# Patient Record
Sex: MALE | Race: WHITE | NOT HISPANIC OR LATINO | URBAN - METROPOLITAN AREA
[De-identification: names, ages, dates, MRNs, and addresses within clinical notes are randomized per-mention and may not be internally consistent; named-entity substitution may affect disease eponyms.]

---

## 2017-03-07 ENCOUNTER — OFFICE VISIT - HEALTHEAST (OUTPATIENT)
Dept: FAMILY MEDICINE | Facility: CLINIC | Age: 32
End: 2017-03-07

## 2017-03-07 DIAGNOSIS — L30.9 ECZEMA: ICD-10-CM

## 2017-03-07 DIAGNOSIS — I83.90 VARICOSE VEIN OF LEG: ICD-10-CM

## 2017-03-07 RX ORDER — TRIAMCINOLONE ACETONIDE 1 MG/G
CREAM TOPICAL
Qty: 45 G | Refills: 3 | Status: SHIPPED | OUTPATIENT
Start: 2017-03-07

## 2017-03-20 ENCOUNTER — COMMUNICATION - HEALTHEAST (OUTPATIENT)
Dept: ADMINISTRATIVE | Facility: CLINIC | Age: 32
End: 2017-03-20

## 2019-07-29 ENCOUNTER — COMMUNICATION - HEALTHEAST (OUTPATIENT)
Dept: FAMILY MEDICINE | Facility: CLINIC | Age: 34
End: 2019-07-29

## 2019-07-29 DIAGNOSIS — Z23 NEED FOR VACCINATION: ICD-10-CM

## 2019-07-30 ENCOUNTER — AMBULATORY - HEALTHEAST (OUTPATIENT)
Dept: NURSING | Facility: CLINIC | Age: 34
End: 2019-07-30

## 2019-07-30 DIAGNOSIS — Z23 NEED FOR VACCINATION: ICD-10-CM

## 2019-10-22 ENCOUNTER — AMBULATORY - HEALTHEAST (OUTPATIENT)
Dept: NURSING | Facility: CLINIC | Age: 34
End: 2019-10-22

## 2020-10-20 ENCOUNTER — AMBULATORY - HEALTHEAST (OUTPATIENT)
Dept: NURSING | Facility: CLINIC | Age: 35
End: 2020-10-20

## 2021-05-19 ENCOUNTER — HOSPITAL ENCOUNTER (OUTPATIENT)
Dept: CT IMAGING | Facility: CLINIC | Age: 36
Discharge: HOME | End: 2021-05-19

## 2021-05-19 ENCOUNTER — COMMUNICATION - HEALTHEAST (OUTPATIENT)
Dept: FAMILY MEDICINE | Facility: CLINIC | Age: 36
End: 2021-05-19

## 2021-05-19 ENCOUNTER — OFFICE VISIT - HEALTHEAST (OUTPATIENT)
Dept: FAMILY MEDICINE | Facility: CLINIC | Age: 36
End: 2021-05-19

## 2021-05-19 DIAGNOSIS — R10.9 FLANK PAIN: ICD-10-CM

## 2021-05-19 DIAGNOSIS — R31.9 HEMATURIA, UNSPECIFIED TYPE: ICD-10-CM

## 2021-05-19 DIAGNOSIS — R39.15 URINARY URGENCY: ICD-10-CM

## 2021-05-19 LAB
ALBUMIN UR-MCNC: NEGATIVE G/DL
APPEARANCE UR: CLEAR
BACTERIA #/AREA URNS HPF: ABNORMAL /[HPF]
BILIRUB UR QL STRIP: NEGATIVE
COLOR UR AUTO: YELLOW
GLUCOSE UR STRIP-MCNC: NEGATIVE MG/DL
HGB UR QL STRIP: ABNORMAL
KETONES UR STRIP-MCNC: NEGATIVE MG/DL
LEUKOCYTE ESTERASE UR QL STRIP: NEGATIVE
NITRATE UR QL: NEGATIVE
PH UR STRIP: 8.5 [PH] (ref 5–8)
RBC #/AREA URNS AUTO: ABNORMAL HPF
SP GR UR STRIP: 1.02 (ref 1–1.03)
SQUAMOUS #/AREA URNS AUTO: ABNORMAL LPF
UROBILINOGEN UR STRIP-ACNC: ABNORMAL
WBC #/AREA URNS AUTO: ABNORMAL HPF

## 2021-05-19 RX ORDER — IBUPROFEN 600 MG/1
600 TABLET, FILM COATED ORAL EVERY 6 HOURS PRN
Qty: 60 TABLET | Refills: 2 | Status: SHIPPED | OUTPATIENT
Start: 2021-05-19

## 2021-05-27 VITALS
OXYGEN SATURATION: 97 % | DIASTOLIC BLOOD PRESSURE: 74 MMHG | RESPIRATION RATE: 20 BRPM | SYSTOLIC BLOOD PRESSURE: 118 MMHG | HEART RATE: 88 BPM | TEMPERATURE: 99 F | WEIGHT: 181.4 LBS

## 2021-05-30 VITALS — WEIGHT: 164.2 LBS

## 2021-06-09 NOTE — PROGRESS NOTES
ASSESSMENT/PLAN:       1. Varicose vein of leg  -We discussed that he would need to wear compression socks for at least 3 months prior to being able to have any sort of intervention done on this vein.  We did provide him with a prescription today to start using.  I did place a referral for the vascular center as well.  They will follow-up there as needed.  Call here if they have any other questions or concerns.  - Ambulatory referral to Vascular Center    2. Eczema  -Mild eczema present today on exam.  He does have a high risk job given how much he has to wash his hands.  Triamcinolone cream provided for him today, discussed good hydration of the skin with an adequate lotion and follow-up here as needed.  - triamcinolone (KENALOG) 0.1 % cream; Apply small amount to affected 2-3 times daily until gone  Dispense: 45 g; Refill: 3        Payal Garcia MD      PROGRESS NOTE   3/7/2017    SUBJECTIVE:  Theo Olivier is a 31 y.o. male  who presents for two issues.     He has a vein on his medial right leg that bothers him.  It has been there for some time, at least a few years.  It does hurt at times as well and it seems to be quite superficial which makes the patient nervous.  Apparently his father-in-law had some varicose veins that became ulcers and he needed to have chronic wound care to help heal them and this also makes him nervous.  He does stand at work quite a bit as he works at a [employer].     He has some eczema on his hands and sometimes on his face on the right side of his nose and then at times around his waist where the belt will rub. He was using a steroid cream that they had which did seem to help a little. He was using OTC lotion and that was making things worse.     Chief Complaint   Patient presents with     Varicose Veins     Patient would like to discuss varicose veins, wonders about his right leg.      Eczema     Patient would like to discuss the ecxema flare up on his hands.           Patient Active Problem List   Diagnosis     Erectile dysfunction     Kidney stone     Eczema       Current Outpatient Prescriptions   Medication Sig Dispense Refill     acetaminophen (TYLENOL) 325 MG tablet Take 650 mg by mouth every 6 (six) hours as needed for pain.       sildenafil (VIAGRA) 100 MG tablet Take 1/2-1 tablet about 30min prior to sex 18 tablet 3     dimenhyDRINATE (DRAMAMINE) 50 mg Chew Chew 1 tablet (50 mg total) 3 (three) times a day. 21 each 0     ibuprofen (ADVIL,MOTRIN) 200 MG tablet Take 800 mg by mouth 3 (three) times a day as needed for pain.        ibuprofen (ADVIL,MOTRIN) 600 MG tablet Take 1 tablet (600 mg total) by mouth every 6 (six) hours as needed for pain. 20 tablet 0     triamcinolone (KENALOG) 0.1 % cream Apply small amount to affected 2-3 times daily until gone 45 g 3     No current facility-administered medications for this visit.        History   Smoking Status     Never Smoker   Smokeless Tobacco     Not on file           OBJECTIVE:        No results found for this or any previous visit (from the past 240 hour(s)).    Vitals:    03/07/17 1402   BP: 130/76   Patient Site: Right Arm   Patient Position: Sitting   Cuff Size: Adult Regular   Pulse: 78   Weight: 164 lb 3.2 oz (74.5 kg)     Weight: 164 lb 3.2 oz (74.5 kg)        Physical Exam:  GENERAL APPEARANCE: A&A, NAD, well hydrated, well nourished  SKIN:  Normal skin turgor, mild dryness and scaling over the knuckles on bilateral hands, face appears unremarkable today  HEENT: moist mucous membranes, no rhinorrhea  EXTREMITY: no edema, right lower extremity with varicose vein present medially.  NEURO: no gross deficits

## 2021-06-15 PROBLEM — L30.9 ECZEMA: Status: ACTIVE | Noted: 2017-03-07

## 2021-06-17 NOTE — TELEPHONE ENCOUNTER
New Appointment Needed  What is the reason for the visit:    New patient wanting to establish care with Dr. Garcia at the [place] location. The patient would like to be seen for a preventative care check, and also discuss testosterone level concerns that they have.  Provider Preference: Dr. Garcia  How soon do you need to be seen?: whenever possible  Waitlist offered?: No  Okay to leave a detailed message:  Yes

## 2021-06-18 NOTE — PATIENT INSTRUCTIONS - HE
Patient Instructions by Isi Berry MD at 5/19/2021 11:10 AM     Author: Isi Berry MD Service: -- Author Type: Physician    Filed: 5/19/2021 11:57 AM Encounter Date: 5/19/2021 Status: Signed    : Isi Berry MD (Physician)       Patient Education     Kidney Stone with Pain    The sharp cramping pain on either side of your lower back and nausea/vomiting that you have are because of a small stone that has formed in the kidney. It is now passing down a narrow tube (ureter) on its way to your bladder. Once the stone reaches your bladder, the pain will often stop. But it may come back as the stone continues to pass out of the bladder and through the urethra. The stone may pass in your urine stream in one piece. The size may be 1/16 inch to 1/4 inch (1 mm to 6 mm). Or, the stone may break up into cristina fragments that you may not even notice.  Once you have had a kidney stone, you are at risk of getting another one in the future. There are 4 types of kidney stones. Eighty percent are calcium stones--mostly calcium oxalate but also some with calcium phosphate. The other 3 types include uric acid stones, struvite stones (from a preceding infection), and rarely, cystine stones.  Most stones will pass on their own, but may take from a few hours to a few days. Sometimes the stone is too large to pass by itself. In that case, the healthcare provider will need to use other ways to remove the stone. These techniques include:    Lithotripsy. This uses ultrasound waves to break up the stone.    Ureteroscopy. This pushes a basket-like instrument through the urethra and bladder and into the ureter to pull out the stone.    Various types of direct surgery through the skin  Home care  The following are general care guidelines:    Drink plenty of fluids. This means at least 12, 8-ounce glasses of fluid--mostly water--a day.    Each time you urinate, do so in a jar. Pour the urine from the  jar through the strainer and into the toilet. Continue doing this until 24 hours after your pain stops. By then, if there was a kidney stone, it should pass from your bladder. Some stones dissolve into sand-like particles and pass right through the strainer. In that case, you wont ever see a stone.    Save any stone that you find in the strainer and bring it to your healthcare provider to look at. It may be possible to stop certain types of stones from forming. For this reason, it is important to know what kind of stone you have.    Try to stay as active as possible. This will help the stone pass. Don't stay in bed unless your pain keeps you from getting up. You may notice a red, pink, or brown color to your urine. This is normal while passing a kidney stone.    If you develop pain, you may take ibuprofen or naproxen for pain, unless another medicine was prescribed. If you have chronic liver or kidney disease, talk with your healthcare provider before taking these medicines. Also talk with your provider if you've had a stomach ulcer or GI bleeding.  Preventing stones  Each year for the next 5 to 7 years, you are at risk that a new stone will form. Your risk is a 50% chance over this time period. The risk is higher if you have a family history of kidney stones or have certain chronic illnesses like hypertension, obesity, or diabetes. But you can make changes to your lifestyle and diet that can lower your risk for another stone.  Most kidney stones are made of calcium. The following is advice for preventing another calcium stone. If you dont know the type of stone you have, follow this advice until the cause of your stone is found.  Things that help:    The most important thing you can do is to drink plenty of fluids each day. See home care above.     Eat foods that contain phytates. These include wheat, rice, rye, barley, and beans. Phytates are substances that may lower your risk for any type of stone to  form.    Eat more fruits and vegetables. Choose those that are high in potassium.    Eat foods high in natural citrate like fruit and low-sugar fruit juices.    Having too little calcium in your diet can put you at risk for calcium kidney stones. Eat a normal amount of calcium in your diet and talk with your healthcare provider if you are taking calcium supplements. Cutting back on your calcium intake may raise your risk. New research shows that eating calcium-rich and oxalate-rich foods together lowers your risk for stones by binding the minerals in the stomach and intestines before they can reach the kidneys.      Limit salt intake to 2 grams (1 teaspoon) per day. Use limited amounts when cooking, and dont add salt at the table. Processed and canned foods are usually high in salt.     Spinach, rhubarb, peanuts, cashews, almonds, grapefruit, and grapefruit juice are all high oxalate foods. You should limit how much of these you eat. Or eat them with calcium-rich foods. These include dairy products, dark leafy greens, soy products, and calcium-enriched foods.    Reducing the amount of animal meat and high protein foods in your diet may lower your risk for uric acid stones.    Avoid excess sugar (sucrose) and fructose (sweetener in many soft drinks) in your diet.     If you take vitamin C as a supplement, don't take more than 1,000 mg a day.    A dietitian or your healthcare provider can give you information about changes in your diet that will help prevent more kidney stones from forming.  Follow-up care  Follow up with your healthcare provider, or as advised, if the pain lasts more than 48 hours. Talk with your provider about urine and blood tests to find out the cause of your stone. If you had an X-ray, CT scan, or other diagnostic test, you will be told of any new findings that may affect your care.  Call 911  Call 911 if you have any of these:    Weakness, dizziness, or fainting  When to seek medical  advice  Call your healthcare provider right away if any of these occur:    Pain that is not controlled by the medicine given    Repeated vomiting or unable to keep down fluids    Fever of 100.4 F (38 C) or higher, or as directed by your healthcare provider    Passage of solid red or brown urine (can't see through it) or urine with lots of blood clots    Foul-smelling or cloudy urine    Unable to pass urine for 8 hours and increasing bladder pressure  Date Last Reviewed: 10/1/2016    6400-3284 [employer]. [address] [place] [address], [place] [address]. All rights reserved. This information is not intended as a substitute for professional medical care. Always follow your healthcare professional's instructions.

## 2021-06-26 ENCOUNTER — HEALTH MAINTENANCE LETTER (OUTPATIENT)
Age: 36
End: 2021-06-26

## 2021-06-30 NOTE — PROGRESS NOTES
Progress Notes by Isi Berry MD at 5/19/2021 11:10 AM     Author: Isi Berry MD Service: -- Author Type: Physician    Filed: 5/19/2021  4:05 PM Encounter Date: 5/19/2021 Status: Signed    : Isi Berry MD (Physician)       Clinical Decision Making:      Recommended CT scan since he has had symptoms ongoing for about a week here without the stone passing.  Would like to make sure it is a size that he is able to pass as an outpatient.    CT results pending.  Will talk to patient after we get CT results.  Update: 3mm stone.  Should pass.  Follow plan that we discussed at clinic and follow up if worsening or not improving.    Discussed using the ibuprofen every 6 hours as needed for pain.  Take with food.  Flomax once a day.  Drink plenty of fluids, mostly water.  Strain urine.  Strainer provided.    At the end of the encounter, I discussed results, diagnosis, medications. Discussed red flags for immediate return to clinic/ER, as well as indications for follow up if no improvement. Patient understood and agreed to plan. Patient was stable for discharge.    1. Flank pain  Urinalysis-UC if Indicated    ketorolac injection 30 mg (TORADOL)    CT Abdomen Pelvis Without Oral Without IV Contrast    ibuprofen (ADVIL,MOTRIN) 600 MG tablet    tamsulosin (FLOMAX) 0.4 mg cap   2. Hematuria, unspecified type  CT Abdomen Pelvis Without Oral Without IV Contrast    ibuprofen (ADVIL,MOTRIN) 600 MG tablet    tamsulosin (FLOMAX) 0.4 mg cap   3. Urinary urgency  Urinalysis-UC if Indicated    ketorolac injection 30 mg (TORADOL)         Patient Instructions   Patient Education     Kidney Stone with Pain    The sharp cramping pain on either side of your lower back and nausea/vomiting that you have are because of a small stone that has formed in the kidney. It is now passing down a narrow tube (ureter) on its way to your bladder. Once the stone reaches your bladder, the pain will often stop. But  it may come back as the stone continues to pass out of the bladder and through the urethra. The stone may pass in your urine stream in one piece. The size may be 1/16 inch to 1/4 inch (1 mm to 6 mm). Or, the stone may break up into cristina fragments that you may not even notice.  Once you have had a kidney stone, you are at risk of getting another one in the future. There are 4 types of kidney stones. Eighty percent are calcium stones--mostly calcium oxalate but also some with calcium phosphate. The other 3 types include uric acid stones, struvite stones (from a preceding infection), and rarely, cystine stones.  Most stones will pass on their own, but may take from a few hours to a few days. Sometimes the stone is too large to pass by itself. In that case, the healthcare provider will need to use other ways to remove the stone. These techniques include:    Lithotripsy. This uses ultrasound waves to break up the stone.    Ureteroscopy. This pushes a basket-like instrument through the urethra and bladder and into the ureter to pull out the stone.    Various types of direct surgery through the skin  Home care  The following are general care guidelines:    Drink plenty of fluids. This means at least 12, 8-ounce glasses of fluid--mostly water--a day.    Each time you urinate, do so in a jar. Pour the urine from the jar through the strainer and into the toilet. Continue doing this until 24 hours after your pain stops. By then, if there was a kidney stone, it should pass from your bladder. Some stones dissolve into sand-like particles and pass right through the strainer. In that case, you wont ever see a stone.    Save any stone that you find in the strainer and bring it to your healthcare provider to look at. It may be possible to stop certain types of stones from forming. For this reason, it is important to know what kind of stone you have.    Try to stay as active as possible. This will help the stone pass. Don't stay in  bed unless your pain keeps you from getting up. You may notice a red, pink, or brown color to your urine. This is normal while passing a kidney stone.    If you develop pain, you may take ibuprofen or naproxen for pain, unless another medicine was prescribed. If you have chronic liver or kidney disease, talk with your healthcare provider before taking these medicines. Also talk with your provider if you've had a stomach ulcer or GI bleeding.  Preventing stones  Each year for the next 5 to 7 years, you are at risk that a new stone will form. Your risk is a 50% chance over this time period. The risk is higher if you have a family history of kidney stones or have certain chronic illnesses like hypertension, obesity, or diabetes. But you can make changes to your lifestyle and diet that can lower your risk for another stone.  Most kidney stones are made of calcium. The following is advice for preventing another calcium stone. If you dont know the type of stone you have, follow this advice until the cause of your stone is found.  Things that help:    The most important thing you can do is to drink plenty of fluids each day. See home care above.     Eat foods that contain phytates. These include wheat, rice, rye, barley, and beans. Phytates are substances that may lower your risk for any type of stone to form.    Eat more fruits and vegetables. Choose those that are high in potassium.    Eat foods high in natural citrate like fruit and low-sugar fruit juices.    Having too little calcium in your diet can put you at risk for calcium kidney stones. Eat a normal amount of calcium in your diet and talk with your healthcare provider if you are taking calcium supplements. Cutting back on your calcium intake may raise your risk. New research shows that eating calcium-rich and oxalate-rich foods together lowers your risk for stones by binding the minerals in the stomach and intestines before they can reach the kidneys.      Limit  salt intake to 2 grams (1 teaspoon) per day. Use limited amounts when cooking, and dont add salt at the table. Processed and canned foods are usually high in salt.     Spinach, rhubarb, peanuts, cashews, almonds, grapefruit, and grapefruit juice are all high oxalate foods. You should limit how much of these you eat. Or eat them with calcium-rich foods. These include dairy products, dark leafy greens, soy products, and calcium-enriched foods.    Reducing the amount of animal meat and high protein foods in your diet may lower your risk for uric acid stones.    Avoid excess sugar (sucrose) and fructose (sweetener in many soft drinks) in your diet.     If you take vitamin C as a supplement, don't take more than 1,000 mg a day.    A dietitian or your healthcare provider can give you information about changes in your diet that will help prevent more kidney stones from forming.  Follow-up care  Follow up with your healthcare provider, or as advised, if the pain lasts more than 48 hours. Talk with your provider about urine and blood tests to find out the cause of your stone. If you had an X-ray, CT scan, or other diagnostic test, you will be told of any new findings that may affect your care.  Call 911  Call 911 if you have any of these:    Weakness, dizziness, or fainting  When to seek medical advice  Call your healthcare provider right away if any of these occur:    Pain that is not controlled by the medicine given    Repeated vomiting or unable to keep down fluids    Fever of 100.4 F (38 C) or higher, or as directed by your healthcare provider    Passage of solid red or brown urine (can't see through it) or urine with lots of blood clots    Foul-smelling or cloudy urine    Unable to pass urine for 8 hours and increasing bladder pressure  Date Last Reviewed: 10/1/2016    7685-6702 [employer]. [address] [place] [address], [place] [address]. All rights reserved. This information is not intended as a substitute for  professional medical care. Always follow your healthcare professional's instructions.                  Chief Complaint   Patient presents with   ? right flank pain, urgency with urination     sx's x 3-4  days constant but off and on 2-3 weeks prior. Denies fevers.         HPI:  Theo Olivier is a 35 y.o. male who presents today complaining of right sided back pain and urinary frequency.  Pain is mostly at the right side of his back but it radiates through all the way to his penis.  Is been going on a little bit for 2 to 3 weeks but has been getting much worse over the last week.  He has increased his water intake and has taken ibuprofen 400 mg this morning.  He has not noticed blood in the urine but it does look dark.  He has no concern about sexually transmitted diseases.  No fevers or chills  No nausea or abdominal pain  He has had a little bit of loose stool.  Positive history of a renal stone about 3 years ago which passed on its own.    History obtained from the patient.    Problem List:  2017-03: Eczema  2015-07: Erectile dysfunction  2015-07: Kidney stone      Past Medical History:   Diagnosis Date   ? Kidney stone        Social History     Tobacco Use   ? Smoking status: Never Smoker   ? Smokeless tobacco: Never Used   Substance Use Topics   ? Alcohol use: Yes     Alcohol/week: 14.0 standard drinks     Types: 14 Shots of liquor per week       Review of systems  Review of systems is negative except as listed in HPI.      Vitals:    05/19/21 1102   BP: 118/74   Patient Site: Left Arm   Patient Position: Sitting   Cuff Size: Adult Regular   Pulse: 88   Resp: 20   Temp: 99  F (37.2  C)   TempSrc: Oral   SpO2: 97%   Weight: 181 lb 6.4 oz (82.3 kg)       Physical Exam  Vitals noted and within normal limits.  Patient is alert, oriented, and in mild distress as he is sitting leaning towards the left with his right hand on his back.  Lungs: Breathing is not labored  Abdomen: soft, nondistended and nontender.   Back:  Mild CVA tenderness on the right  UA noted with hematuria  Patient given Toradol 30 mg IM while in clinic.  Recheck shows him sitting more comfortably with his weight evenly distributed and his hand no longer on his back.  He reports improved pain control.    No notes on file    Labs:  Recent Results (from the past 72 hour(s))   Urinalysis-UC if Indicated   Result Value Ref Range    Color, UA Yellow Colorless, Yellow, Straw, Light Yellow    Clarity, UA Clear Clear    Glucose, UA Negative Negative    Protein, UA Negative Negative    Bilirubin, UA Negative Negative    Urobilinogen, UA 1.0 E.U./dL 0.2 E.U./dL, 1.0 E.U./dL    pH, UA 8.5 (H) 5.0 - 8.0    Blood, UA Large (!) Negative    Ketones, UA Negative Negative    Nitrite, UA Negative Negative    Leukocytes, UA Negative Negative    Specific Gravity, UA 1.020 1.005 - 1.030    RBC, UA 25-50 (!) None Seen, 0-2 hpf    WBC, UA 0-5 None Seen, 0-5 hpf    Bacteria, UA None Seen None Seen    Squam Epithel, UA 0-5 None Seen, 0-5 lpf       Radiology:  No results found.   Talked with radiologist.  3mm stone distal right ureter with mild hydronephrosis.  Discussed with pt on the phone.

## 2021-07-01 ENCOUNTER — AMBULATORY - HEALTHEAST (OUTPATIENT)
Dept: FAMILY MEDICINE | Facility: CLINIC | Age: 36
End: 2021-07-01

## 2021-07-01 DIAGNOSIS — Z23 IMMUNIZATION DUE: ICD-10-CM

## 2021-10-16 ENCOUNTER — HEALTH MAINTENANCE LETTER (OUTPATIENT)
Age: 36
End: 2021-10-16

## 2021-11-06 ENCOUNTER — IMMUNIZATION (OUTPATIENT)
Dept: FAMILY MEDICINE | Facility: CLINIC | Age: 36
End: 2021-11-06
Payer: COMMERCIAL

## 2021-11-06 PROCEDURE — 90471 IMMUNIZATION ADMIN: CPT

## 2021-11-06 PROCEDURE — 90686 IIV4 VACC NO PRSV 0.5 ML IM: CPT

## 2021-11-22 ENCOUNTER — IMMUNIZATION (OUTPATIENT)
Dept: NURSING | Facility: CLINIC | Age: 36
End: 2021-11-22
Payer: COMMERCIAL

## 2021-11-22 PROCEDURE — 91306 COVID-19,PF,MODERNA (18+ YRS BOOSTER .25ML): CPT

## 2021-11-22 PROCEDURE — 0064A COVID-19,PF,MODERNA (18+ YRS BOOSTER .25ML): CPT

## 2022-04-21 ENCOUNTER — LAB (OUTPATIENT)
Dept: LAB | Facility: CLINIC | Age: 37
End: 2022-04-21
Payer: COMMERCIAL

## 2022-04-21 DIAGNOSIS — Z11.1 SCREENING EXAMINATION FOR PULMONARY TUBERCULOSIS: ICD-10-CM

## 2022-04-21 PROCEDURE — 86481 TB AG RESPONSE T-CELL SUSP: CPT

## 2022-04-21 PROCEDURE — 36415 COLL VENOUS BLD VENIPUNCTURE: CPT

## 2022-04-23 LAB
QUANTIFERON MITOGEN: 10 IU/ML
QUANTIFERON NIL TUBE: 0.08 IU/ML
QUANTIFERON TB1 TUBE: 0.08 IU/ML
QUANTIFERON TB2 TUBE: 0.08

## 2022-04-24 LAB
GAMMA INTERFERON BACKGROUND BLD IA-ACNC: 0.08 IU/ML
M TB IFN-G BLD-IMP: NEGATIVE
M TB IFN-G CD4+ BCKGRND COR BLD-ACNC: 9.92 IU/ML
MITOGEN IGNF BCKGRD COR BLD-ACNC: 0 IU/ML
MITOGEN IGNF BCKGRD COR BLD-ACNC: 0 IU/ML

## 2022-05-06 DIAGNOSIS — N52.9 ERECTILE DYSFUNCTION, UNSPECIFIED ERECTILE DYSFUNCTION TYPE: Primary | ICD-10-CM

## 2022-05-09 RX ORDER — SILDENAFIL 100 MG/1
TABLET, FILM COATED ORAL
Qty: 18 TABLET | Refills: 1 | Status: SHIPPED | OUTPATIENT
Start: 2022-05-09 | End: 2022-08-22

## 2022-05-09 NOTE — TELEPHONE ENCOUNTER
"Last Written Prescription Date:  6/23/21  Last Fill Quantity: 18,  # refills: 3   Last office visit provider:  6/23/21     Requested Prescriptions   Pending Prescriptions Disp Refills     sildenafil (VIAGRA) 100 MG tablet [Pharmacy Med Name: SILDENAFIL 100MG] 18 tablet 2     Sig: TAKE 1/2-1 TABLET ABOUT 30MIN PRIOR TO SEX       Erectile Dysfuction Protocol Passed - 5/9/2022 11:50 AM        Passed - Absence of nitrates on medication list        Passed - Absence of Alpha Blockers on Med list        Passed - Recent (12 mo) or future (30 days) visit within the authorizing provider's specialty     Patient has had an office visit with the authorizing provider or a provider within the authorizing providers department within the previous 12 mos or has a future within next 30 days. See \"Patient Info\" tab in inbasket, or \"Choose Columns\" in Meds & Orders section of the refill encounter.              Passed - Medication is active on med list        Passed - Patient is age 18 or older             Chava Oneal RN 05/09/22 11:50 AM    "

## 2022-07-08 ENCOUNTER — MYC MEDICAL ADVICE (OUTPATIENT)
Dept: FAMILY MEDICINE | Facility: CLINIC | Age: 37
End: 2022-07-08

## 2022-07-08 NOTE — TELEPHONE ENCOUNTER
Medical message sent to patient  Ines E. Marcus Jones RN on 7/8/2022 at 12:45 PM

## 2022-07-11 ENCOUNTER — TELEPHONE (OUTPATIENT)
Dept: FAMILY MEDICINE | Facility: CLINIC | Age: 37
End: 2022-07-11

## 2022-07-11 DIAGNOSIS — N52.9 ERECTILE DYSFUNCTION, UNSPECIFIED ERECTILE DYSFUNCTION TYPE: ICD-10-CM

## 2022-07-11 NOTE — TELEPHONE ENCOUNTER
[facility] pharmacy sent refill form for Sildenafil 100mg tablet. Form faxed on 7/8/22. Please sign and return.    Fax# [phone]  EScribe# [id number]

## 2022-07-14 RX ORDER — SILDENAFIL 100 MG/1
TABLET, FILM COATED ORAL
Qty: 18 TABLET | Refills: 3 | Status: SHIPPED | OUTPATIENT
Start: 2022-07-14 | End: 2022-08-22

## 2022-07-14 NOTE — TELEPHONE ENCOUNTER
REFILL REQUEST. New pharmacy    Medication: Sildenafil 100mg  Last Date Filled: 5/9/22  Last appointment addressing medication: 6/23/21  Last B/P:  BP Readings from Last 3 Encounters:   06/23/21 128/78   05/19/21 118/74

## 2022-07-18 ENCOUNTER — NURSE TRIAGE (OUTPATIENT)
Dept: NURSING | Facility: CLINIC | Age: 37
End: 2022-07-18

## 2022-07-18 NOTE — TELEPHONE ENCOUNTER
Clinic Action Needed: Yes, please call pharmacy  [facility] Triage Call  Presenting Problem:    Caller: [facility]   Phone #: [phone]    Please call pharmacy for maximum daily dose of sildenafil, for billing purposes.     Isidra Ann RN/[place] Nurse Advisor          Reason for Disposition    Pharmacy calling with prescription questions and triager unable to answer question    Protocols used: MEDICATION QUESTION CALL-A-[place]

## 2022-07-18 NOTE — TELEPHONE ENCOUNTER
FNA called [facility] pharmacy physician line: [phone].    FNA relayed information below for sildenafil:  Maximum daily dose 100mg. - Dr. Garcia.    [facility] received the call and verbalized understanding.    Isidra Ann RN/[place] Nurse Advisor

## 2022-07-23 ENCOUNTER — HEALTH MAINTENANCE LETTER (OUTPATIENT)
Age: 37
End: 2022-07-23

## 2022-08-16 ASSESSMENT — ENCOUNTER SYMPTOMS
FREQUENCY: 0
HEARTBURN: 0
ARTHRALGIAS: 0
HEMATOCHEZIA: 0
NAUSEA: 0
PARESTHESIAS: 1
SHORTNESS OF BREATH: 0
CONSTIPATION: 0
MYALGIAS: 1
DIARRHEA: 0
DYSURIA: 0
ABDOMINAL PAIN: 0
DIZZINESS: 0
COUGH: 0
CHILLS: 0
FEVER: 0
HEMATURIA: 0
EYE PAIN: 0
NERVOUS/ANXIOUS: 0
JOINT SWELLING: 0
SORE THROAT: 0
PALPITATIONS: 0
WEAKNESS: 0
HEADACHES: 0

## 2022-08-17 ENCOUNTER — OFFICE VISIT (OUTPATIENT)
Dept: FAMILY MEDICINE | Facility: CLINIC | Age: 37
End: 2022-08-17
Payer: COMMERCIAL

## 2022-08-17 VITALS
DIASTOLIC BLOOD PRESSURE: 62 MMHG | TEMPERATURE: 98 F | WEIGHT: 154 LBS | HEART RATE: 61 BPM | SYSTOLIC BLOOD PRESSURE: 138 MMHG | BODY MASS INDEX: 22.81 KG/M2 | OXYGEN SATURATION: 98 % | HEIGHT: 69 IN

## 2022-08-17 DIAGNOSIS — Z23 IMMUNIZATION DUE: ICD-10-CM

## 2022-08-17 DIAGNOSIS — R20.2 PARESTHESIAS: ICD-10-CM

## 2022-08-17 DIAGNOSIS — K76.0 FATTY LIVER: ICD-10-CM

## 2022-08-17 DIAGNOSIS — Z00.00 ENCOUNTER FOR ROUTINE HISTORY AND PHYSICAL EXAMINATION OF ADULT: Primary | ICD-10-CM

## 2022-08-17 DIAGNOSIS — N52.9 ERECTILE DYSFUNCTION, UNSPECIFIED ERECTILE DYSFUNCTION TYPE: ICD-10-CM

## 2022-08-17 DIAGNOSIS — Z13.220 LIPID SCREENING: ICD-10-CM

## 2022-08-17 LAB
ALBUMIN SERPL BCG-MCNC: 5 G/DL (ref 3.5–5.2)
ALP SERPL-CCNC: 47 U/L (ref 40–129)
ALT SERPL W P-5'-P-CCNC: 24 U/L (ref 10–50)
ANION GAP SERPL CALCULATED.3IONS-SCNC: 11 MMOL/L (ref 7–15)
AST SERPL W P-5'-P-CCNC: 29 U/L (ref 10–50)
BILIRUB SERPL-MCNC: 0.6 MG/DL
BUN SERPL-MCNC: 18.5 MG/DL (ref 6–20)
CALCIUM SERPL-MCNC: 10.2 MG/DL (ref 8.6–10)
CHLORIDE SERPL-SCNC: 99 MMOL/L (ref 98–107)
CHOLEST SERPL-MCNC: 191 MG/DL
CREAT SERPL-MCNC: 0.89 MG/DL (ref 0.67–1.17)
DEPRECATED HCO3 PLAS-SCNC: 28 MMOL/L (ref 22–29)
GFR SERPL CREATININE-BSD FRML MDRD: >90 ML/MIN/1.73M2
GLUCOSE SERPL-MCNC: 93 MG/DL (ref 70–99)
HBA1C MFR BLD: 5.6 % (ref 0–5.6)
HDLC SERPL-MCNC: 60 MG/DL
LDLC SERPL CALC-MCNC: 91 MG/DL
NONHDLC SERPL-MCNC: 131 MG/DL
POTASSIUM SERPL-SCNC: 4.3 MMOL/L (ref 3.4–5.3)
PROT SERPL-MCNC: 7.7 G/DL (ref 6.4–8.3)
SODIUM SERPL-SCNC: 138 MMOL/L (ref 136–145)
TRIGL SERPL-MCNC: 199 MG/DL
TSH SERPL DL<=0.005 MIU/L-ACNC: 1.31 UIU/ML (ref 0.3–4.2)
VIT B12 SERPL-MCNC: 711 PG/ML (ref 232–1245)

## 2022-08-17 PROCEDURE — 80061 LIPID PANEL: CPT | Performed by: FAMILY MEDICINE

## 2022-08-17 PROCEDURE — 90471 IMMUNIZATION ADMIN: CPT | Performed by: FAMILY MEDICINE

## 2022-08-17 PROCEDURE — 83036 HEMOGLOBIN GLYCOSYLATED A1C: CPT | Performed by: FAMILY MEDICINE

## 2022-08-17 PROCEDURE — 84443 ASSAY THYROID STIM HORMONE: CPT | Performed by: FAMILY MEDICINE

## 2022-08-17 PROCEDURE — 80053 COMPREHEN METABOLIC PANEL: CPT | Performed by: FAMILY MEDICINE

## 2022-08-17 PROCEDURE — 99213 OFFICE O/P EST LOW 20 MIN: CPT | Mod: 25 | Performed by: FAMILY MEDICINE

## 2022-08-17 PROCEDURE — 90746 HEPB VACCINE 3 DOSE ADULT IM: CPT | Performed by: FAMILY MEDICINE

## 2022-08-17 PROCEDURE — 99395 PREV VISIT EST AGE 18-39: CPT | Mod: 25 | Performed by: FAMILY MEDICINE

## 2022-08-17 PROCEDURE — 82607 VITAMIN B-12: CPT | Performed by: FAMILY MEDICINE

## 2022-08-17 PROCEDURE — 36415 COLL VENOUS BLD VENIPUNCTURE: CPT | Performed by: FAMILY MEDICINE

## 2022-08-17 RX ORDER — TADALAFIL 10 MG/1
10-20 TABLET ORAL DAILY PRN
Qty: 10 TABLET | Refills: 3 | Status: SHIPPED | OUTPATIENT
Start: 2022-08-17 | End: 2023-12-05

## 2022-08-17 ASSESSMENT — ENCOUNTER SYMPTOMS
PALPITATIONS: 0
NAUSEA: 0
MYALGIAS: 1
FREQUENCY: 0
JOINT SWELLING: 0
PARESTHESIAS: 1
SHORTNESS OF BREATH: 0
COUGH: 0
HEMATOCHEZIA: 0
NERVOUS/ANXIOUS: 0
DYSURIA: 0
WEAKNESS: 0
DIZZINESS: 0
HEMATURIA: 0
FEVER: 0
CHILLS: 0
HEADACHES: 0
CONSTIPATION: 0
ABDOMINAL PAIN: 0
SORE THROAT: 0
EYE PAIN: 0
DIARRHEA: 0
ARTHRALGIAS: 0
HEARTBURN: 0

## 2022-08-17 NOTE — PROGRESS NOTES
SUBJECTIVE:   CC: Theo Olivier is an 37 year old male who presents for preventive health visit.     Healthy Habits:     Getting at least 3 servings of Calcium per day:  Yes    Bi-annual eye exam:  NO    Dental care twice a year:  NO    Sleep apnea or symptoms of sleep apnea:  None    Diet:  Regular (no restrictions)    Frequency of exercise:  4-5 days/week    Duration of exercise:  30-45 minutes    Taking medications regularly:  Yes    Medication side effects:  None    PHQ-2 Total Score: 0    Additional concerns today:  Yes    He notes that his toes have been feeling numb. He did get new boots, does not think that this happened at the same time. It's the left some and mainly on the right. He does have tingling. He doesn't note it when he is working. He does have back pain, hip pain. He doesn't seem to have more sensitivity to heat and cold. He does have varicose veins on the right and he is wondering if this is contributing to his issues.     He does take the Viagra and it does seem to take longer to work.     Scalp as been generally ok, hard to use the solution when his hair is longer, the solution is helping.     Today's PHQ-2 Score:   PHQ-2 ( 1999 Pfizer) 8/16/2022   Q1: Little interest or pleasure in doing things 0   Q2: Feeling down, depressed or hopeless 0   PHQ-2 Score 0   Q1: Little interest or pleasure in doing things Not at all   Q2: Feeling down, depressed or hopeless Not at all   PHQ-2 Score 0       Abuse: Current or Past (Physical, Sexual or Emotional) - No  Do you feel safe in your environment? Yes    Have you ever done Advance Care Planning? (For example, a Health Directive, POLST, or a discussion with a medical provider or your loved ones about your wishes): No, advance care planning information given to patient to review.  Patient declined advance care planning discussion at this time.    Social History     Tobacco Use     Smoking status: Never Smoker     Smokeless tobacco: Never Used  "  Substance Use Topics     Alcohol use: Yes     Alcohol/week: 14.0 standard drinks       Alcohol Use 8/16/2022   Prescreen: >3 drinks/day or >7 drinks/week? No     Reviewed orders with patient.  Reviewed health maintenance and updated orders accordingly - { :205928::\"Yes\"}  {Chronicprobdata (optional):376338}    Breast Cancer Screening:        History of abnormal Pap smear: { :099440}     Reviewed and updated as needed this visit by clinical staff                    Reviewed and updated as needed this visit by Provider                   {HISTORY OPTIONS (Optional):869457}    Review of Systems   Constitutional: Negative for chills and fever.   HENT: Negative for congestion, ear pain, hearing loss and sore throat.    Eyes: Negative for pain and visual disturbance.   Respiratory: Negative for cough and shortness of breath.    Cardiovascular: Negative for chest pain, palpitations and peripheral edema.   Gastrointestinal: Negative for abdominal pain, constipation, diarrhea, heartburn, hematochezia and nausea.   Genitourinary: Positive for impotence. Negative for dysuria, frequency, genital sores, hematuria, penile discharge and urgency.   Musculoskeletal: Positive for myalgias. Negative for arthralgias and joint swelling.   Skin: Negative for rash.   Neurological: Positive for paresthesias. Negative for dizziness, weakness and headaches.   Psychiatric/Behavioral: Negative for mood changes. The patient is not nervous/anxious.      {FEMALE ROS (Optional):919131}     OBJECTIVE:   There were no vitals taken for this visit.  Physical Exam  {Exam Choices (Optional):863025}    {Diagnostic Test Results (Optional):570309::\"Diagnostic Test Results:\",\"Labs reviewed in Epic\"}    ASSESSMENT/PLAN:   {Diag Picklist:069073}    {Patient advised of split billing (Optional):657072}     Ok for cialis if not working with the viagra.     COUNSELING:  {FEMALE COUNSELING MESSAGES:027852::\"Reviewed preventive health counseling, as reflected in " "patient instructions\"}    Estimated body mass index is 26.83 kg/m  as calculated from the following:    Height as of 6/23/21: 1.759 m (5' 9.25\").    Weight as of 6/23/21: 83 kg (183 lb).    {Weight Management Plan (ACO) Complete if BMI is abnormal-  Ages 18-64  BMI >24.9.  Age 65+ with BMI <23 or >30 (Optional):461755}    He reports that he has never smoked. He has never used smokeless tobacco.      Counseling Resources:  ATP IV Guidelines  Pooled Cohorts Equation Calculator  Breast Cancer Risk Calculator  BRCA-Related Cancer Risk Assessment: FHS-7 Tool  FRAX Risk Assessment  ICSI Preventive Guidelines  Dietary Guidelines for Americans, 2010  USDA's MyPlate  ASA Prophylaxis  Lung CA Screening    Payal Garcia MD  [facility]  "

## 2022-08-17 NOTE — PROGRESS NOTES
SUBJECTIVE:   CC: Theo Olivier is an 37 year old male who presents for preventative health visit.     Healthy Habits:     Getting at least 3 servings of Calcium per day:  Yes    Bi-annual eye exam:  NO    Dental care twice a year:  NO    Sleep apnea or symptoms of sleep apnea:  None    Diet:  Regular (no restrictions)    Frequency of exercise:  4-5 days/week    Duration of exercise:  30-45 minutes    Taking medications regularly:  Yes    Medication side effects:  None    PHQ-2 Total Score: 0    Additional concerns today:  Yes    He is doing quite well at this time. He does note that he has had a lot of weight loss since starting to work at [employer].     He notes that his toes have been feeling numb. He did get new boots, does not think that this happened at the same time. It's the left some and mainly on the right. He does have tingling. He doesn't note it when he is working. He does have back pain, hip pain. He doesn't seem to have more sensitivity to heat and cold. He does have varicose veins on the right and he is wondering if this is contributing to his issues.     He does take the Viagra and it does seem to take longer to work.     Scalp as been generally ok, hard to use the solution when his hair is longer, the solution is helping.     Today's PHQ-2 Score:   PHQ-2 ( 1999 Pfizer) 8/16/2022   Q1: Little interest or pleasure in doing things 0   Q2: Feeling down, depressed or hopeless 0   PHQ-2 Score 0   Q1: Little interest or pleasure in doing things Not at all   Q2: Feeling down, depressed or hopeless Not at all   PHQ-2 Score 0       Abuse: Current or Past(Physical, Sexual or Emotional)- No  Do you feel safe in your environment? Yes    Social History     Tobacco Use     Smoking status: Never Smoker     Smokeless tobacco: Never Used   Substance Use Topics     Alcohol use: Yes     Alcohol/week: 14.0 standard drinks       Alcohol Use 8/16/2022   Prescreen: >3 drinks/day or >7 drinks/week? No     Last PSA: No results  "found for: PSA    Reviewed orders with patient. Reviewed health maintenance and updated orders accordingly - Yes      Reviewed and updated as needed this visit by clinical staff   Tobacco  Allergies  Meds  Problems  Med Hx  Surg Hx  Fam Hx  Soc   Hx          Reviewed and updated as needed this visit by Provider   Tobacco  Allergies  Meds  Problems  Med Hx  Surg Hx  Fam Hx               Review of Systems   Constitutional: Negative for chills and fever.   HENT: Negative for congestion, ear pain, hearing loss and sore throat.    Eyes: Negative for pain and visual disturbance.   Respiratory: Negative for cough and shortness of breath.    Cardiovascular: Negative for chest pain, palpitations and peripheral edema.   Gastrointestinal: Negative for abdominal pain, constipation, diarrhea, heartburn, hematochezia and nausea.   Genitourinary: Positive for impotence. Negative for dysuria, frequency, genital sores, hematuria, penile discharge and urgency.   Musculoskeletal: Positive for myalgias. Negative for arthralgias and joint swelling.   Skin: Negative for rash.   Neurological: Positive for paresthesias. Negative for dizziness, weakness and headaches.   Psychiatric/Behavioral: Negative for mood changes. The patient is not nervous/anxious.        OBJECTIVE:   /62   Pulse 61   Temp 98  F (36.7  C)   Ht 1.753 m (5' 9\")   Wt 69.9 kg (154 lb)   SpO2 98%   BMI 22.74 kg/m      Physical Exam  GENERAL: healthy, alert and no distress  EYES: Eyes grossly normal to inspection, PERRL and conjunctivae and sclerae normal  HENT: ear canals and TM's normal, nose and mouth without ulcers or lesions  NECK: no adenopathy, no asymmetry, masses, or scars and thyroid normal to palpation  RESP: lungs clear to auscultation - no rales, rhonchi or wheezes  CV: regular rate and rhythm, normal S1 S2, no S3 or S4, no murmur, click or rub, no peripheral edema and peripheral pulses strong  ABDOMEN: soft, nontender, no " hepatosplenomegaly, no masses and bowel sounds normal   (male): deferred  MS: no gross musculoskeletal defects noted, no edema  SKIN: no suspicious lesions or rashes  NEURO: Normal strength and tone, mentation intact and speech normal, decreased sensation in bilateral great toes, normal on the sole.   PSYCH: mentation appears normal, affect normal/bright      ASSESSMENT/PLAN:   Theo was seen today for physical.    Diagnoses and all orders for this visit:    Encounter for routine history and physical examination of adult   Routine health maintenance discussion:  No smoking, limited alcohol (7 or less servings per week), 5 fruits/veg servings per day, 200 minutes of exercise per week.  Daily calcium/vitamin D guidelines, bone health, colon cancer screening beginning at age 50.  Accident avoidance, sun screen.    Erectile dysfunction, unspecified erectile dysfunction type  -     tadalafil (CIALIS) 10 MG tablet; Take 1-2 tablets (10-20 mg) by mouth daily as needed (intercourse)   Will have him trial Cialis as listed and if not helping he will reach out and I can refer to Urology.     Fatty liver  -     Comprehensive metabolic panel (BMP + Alb, Alk Phos, ALT, AST, Total. Bili, TP); Future  -     Comprehensive metabolic panel (BMP + Alb, Alk Phos, ALT, AST, Total. Bili, TP)   Doing well at this time, updating labs as able, consider repeat US in 2024-5    Paresthesias  -     TSH with free T4 reflex; Future  -     Vitamin B12; Future  -     Hemoglobin A1c; Future  -     TSH with free T4 reflex  -     Vitamin B12  -     Updating labs as listed above. If normal we can consider EMG testing of bilateral lower extremities.        Lipid screening  -     Lipid panel reflex to direct LDL Fasting; Future  -     Lipid panel reflex to direct LDL Fasting    Immunization due  -     HEPATITIS B VACCINE, ADULT, IM; Standing  -     HEPATITIS B VACCINE, ADULT, IM    Other orders  -     REVIEW OF HEALTH MAINTENANCE PROTOCOL  "ORDERS        Patient has been advised of split billing requirements and indicates understanding: Yes    COUNSELING:   Reviewed preventive health counseling, as reflected in patient instructions       Regular exercise       Healthy diet/nutrition       Consider Hep C screening for all patients one time for ages 18-79 years       Advance Care Planning    Estimated body mass index is 22.74 kg/m  as calculated from the following:    Height as of this encounter: 1.753 m (5' 9\").    Weight as of this encounter: 69.9 kg (154 lb).         He reports that he has never smoked. He has never used smokeless tobacco.      Counseling Resources:  ATP IV Guidelines  Pooled Cohorts Equation Calculator  FRAX Risk Assessment  ICSI Preventive Guidelines  Dietary Guidelines for Americans, 2010  USDA's MyPlate  ASA Prophylaxis  Lung CA Screening    Payal Garcia MD  [facility]  "

## 2022-09-17 DIAGNOSIS — N52.9 ERECTILE DYSFUNCTION, UNSPECIFIED ERECTILE DYSFUNCTION TYPE: ICD-10-CM

## 2022-09-19 RX ORDER — SILDENAFIL 100 MG/1
TABLET, FILM COATED ORAL
Qty: 18 TABLET | Refills: 3 | Status: SHIPPED | OUTPATIENT
Start: 2022-09-19 | End: 2023-04-24

## 2022-09-19 NOTE — TELEPHONE ENCOUNTER
"Last Written Prescription Date:  6/23/21  Last Fill Quantity: 18,  # refills: 3   Last office visit provider:  8/17/22     Requested Prescriptions   Pending Prescriptions Disp Refills     sildenafil (VIAGRA) 100 MG tablet [Pharmacy Med Name: Sildenafil Citrate 100mg Tablet] 18 tablet 2     Sig: Take 1/2 tablet to 1 tablet by mouth about 30 minutes prior to sex. Max daily dose of 1 tablet.       Erectile Dysfuction Protocol Passed - 9/19/2022  9:31 AM        Passed - Absence of nitrates on medication list        Passed - Absence of Alpha Blockers on Med list        Passed - Recent (12 mo) or future (30 days) visit within the authorizing provider's specialty     Patient has had an office visit with the authorizing provider or a provider within the authorizing providers department within the previous 12 mos or has a future within next 30 days. See \"Patient Info\" tab in inbasket, or \"Choose Columns\" in Meds & Orders section of the refill encounter.              Passed - Medication is active on med list        Passed - Patient is age 18 or older             Chava Oneal RN 09/19/22 9:31 AM  "

## 2022-10-01 ENCOUNTER — HEALTH MAINTENANCE LETTER (OUTPATIENT)
Age: 37
End: 2022-10-01

## 2022-11-22 ENCOUNTER — OFFICE VISIT (OUTPATIENT)
Dept: NEUROLOGY | Facility: CLINIC | Age: 37
End: 2022-11-22
Attending: FAMILY MEDICINE
Payer: COMMERCIAL

## 2022-11-22 DIAGNOSIS — R20.2 PARESTHESIAS: Primary | ICD-10-CM

## 2022-11-22 PROCEDURE — 95886 MUSC TEST DONE W/N TEST COMP: CPT | Mod: RT | Performed by: PSYCHIATRY & NEUROLOGY

## 2022-11-22 PROCEDURE — 95910 NRV CNDJ TEST 7-8 STUDIES: CPT | Performed by: PSYCHIATRY & NEUROLOGY

## 2022-11-22 NOTE — PROCEDURES
[facility]     Formerly [facility]  [address], [address]  [place] [address]  Tel: [phone]  Fax: [phone]          Full Name: Theo Olivier Gender: Male  Patient ID: [id number] YOB: 1985      Visit Date: 11/22/2022 13:40  Age: 37 Years 3 Months Old  Interpreted By: Kennedy Singh M.D.   Ref Dr.: Payal Garcia MD  Tech: ST   Height: 5 feet 8 inch  Reason for referral: Evaluate bilateral lowers. c/o numbness in toes > 6 months. Right = Left.       Motor NCS      Nerve / Sites Lat Amp Dist Misha    ms mV cm m/s   R Peroneal - EDB      Ankle 4.06 9.7 8       Fib head 10.21 9.2 28.5 46      Pop fossa 12.60 8.7 11 46   L Peroneal - EDB      Ankle 4.95 8.2 8       Fib head 10.78 8.2 27.5 47      Pop fossa 13.13 8.2 11 47   R Tibial - AH      Ankle 3.80 8.7 8       Pop fossa 11.56 8.7 40 52   L Tibial - AH      Ankle 4.95 12.3 8       Pop fossa 12.19 12.1 35 48       F  Wave      Nerve Fmin    ms   R Tibial - AH 48.28   L Tibial - AH 48.33       Sensory NCS      Nerve / Sites Pk Lat Amp.1-2 Dist    ms  V cm   R Sural - Ankle (Calf)      Calf 3.33 21.4 14   L Sural - Ankle (Calf)      Calf 3.65 22.6 14   R Superficial peroneal - Ankle      Lat leg 3.02 14.8 12   L Superficial peroneal - Ankle      Lat leg 3.33 14.0 12       EMG Summary Table     Spontaneous MUAP Rcmt Note   Muscle Fib PSW Fasc IA # Amp Dur PPP Rate Type   R. Gluteus medius None None None N N N N N N N   R. Gluteus austin None None None N N N N N N N   R. Upper paraspinal None None None N N N N N N N   R. Middle paraspinal None None None N N N N N N N   R. Lower paraspinal None None None N N N N N N N   R. Adductor can None None None N N N N N N N   R. Quadriceps None None None N N N N N N N   R. Tibialis anterior None None None N N N N N N N   R. Gastrocnemius (Medial head) None None None N N N N N N N   R. Tibialis posterior None None None N N N N N N N   L.  Adductor can None None None N N N N N N N   L. Quadriceps None None None N N N N N N N   L. Tibialis anterior None None None N N N N N N N   L. Gastrocnemius (Medial head) None None None N N N N N N N   L. Tibialis posterior None None None N N N N N N N       Right peroneal motor conduction study normal  Right tibial motor conduction study normal  Right tibial F-wave latency normal    Left peroneal motor conduction study normal  Left tibial motor conduction study normal  Left tibial F wave latency normal    Right sural snap potential normal  Right superficial peroneal snap potential normal    Left sural snap potential normal  Left superficial peroneal  snap potential normal    Needle examination right lower extremity normal  Needle examination left lower extremity normal    Impression  Normal EMG of the lower extremities bilaterally.

## 2022-11-22 NOTE — LETTER
11/22/2022         RE: Theo Olivier  [address]  [facility] [id number]        Dear Colleague,    Thank you for referring your patient, Theo Olivier, to the [facility] [place]. Please see a copy of my visit note below.    See EMG report      Again, thank you for allowing me to participate in the care of your patient.        Sincerely,        Kennedy Singh MD

## 2023-04-24 DIAGNOSIS — N52.9 ERECTILE DYSFUNCTION, UNSPECIFIED ERECTILE DYSFUNCTION TYPE: ICD-10-CM

## 2023-04-24 NOTE — TELEPHONE ENCOUNTER
Refill Request  Medication name: Pending Prescriptions:                       Disp   Refills    sildenafil (VIAGRA) 100 MG tablet         18 tab*3            Sig: Take 1/2 tablet to 1 tablet by mouth about 30           minutes prior to sex. Max daily dose of 1 tablet.    Requested Pharmacy:     [facility] - [place], [place] - [address]

## 2023-04-25 RX ORDER — SILDENAFIL 100 MG/1
TABLET, FILM COATED ORAL
Qty: 18 TABLET | Refills: 2 | Status: SHIPPED | OUTPATIENT
Start: 2023-04-25 | End: 2023-09-11

## 2023-04-25 NOTE — TELEPHONE ENCOUNTER
"Last Written Prescription Date:  9/19/22  Last Fill Quantity: 18,  # refills: 3   Last office visit provider:  8/17/22     Requested Prescriptions   Pending Prescriptions Disp Refills     sildenafil (VIAGRA) 100 MG tablet 18 tablet 3     Sig: Take 1/2 tablet to 1 tablet by mouth about 30 minutes prior to sex. Max daily dose of 1 tablet.       Erectile Dysfuction Protocol Passed - 4/25/2023  3:26 PM        Passed - Absence of nitrates on medication list        Passed - Absence of Alpha Blockers on Med list        Passed - Recent (12 mo) or future (30 days) visit within the authorizing provider's specialty     Patient has had an office visit with the authorizing provider or a provider within the authorizing providers department within the previous 12 mos or has a future within next 30 days. See \"Patient Info\" tab in inbasket, or \"Choose Columns\" in Meds & Orders section of the refill encounter.              Passed - Medication is active on med list        Passed - Patient is age 18 or older             ROEL GONZALEZ RN 04/25/23 3:27 PM  "

## 2023-09-11 DIAGNOSIS — N52.9 ERECTILE DYSFUNCTION, UNSPECIFIED ERECTILE DYSFUNCTION TYPE: ICD-10-CM

## 2023-09-11 RX ORDER — SILDENAFIL 100 MG/1
TABLET, FILM COATED ORAL
Qty: 18 TABLET | Refills: 2 | Status: SHIPPED | OUTPATIENT
Start: 2023-09-11 | End: 2023-12-05

## 2023-09-11 NOTE — TELEPHONE ENCOUNTER
Refill Request  Medication name: Pending Prescriptions:                       Disp   Refills    sildenafil (VIAGRA) 100 MG tablet         18 tab*2            Sig: Take 1/2 tablet to 1 tablet by mouth about 30           minutes prior to sex. Max daily dose of 1 tablet.    Requested Pharmacy:     [facility] - [place], [place] - [address]  [facility] - [place], [place] - [address]

## 2023-10-15 ENCOUNTER — HEALTH MAINTENANCE LETTER (OUTPATIENT)
Age: 38
End: 2023-10-15

## 2023-11-28 ASSESSMENT — ENCOUNTER SYMPTOMS
DIARRHEA: 0
EYE PAIN: 0
DIZZINESS: 0
HEMATOCHEZIA: 0
HEMATURIA: 0
SHORTNESS OF BREATH: 0
MYALGIAS: 0
FREQUENCY: 0
PALPITATIONS: 0
CHILLS: 0
WEAKNESS: 0
ARTHRALGIAS: 0
FEVER: 0
DYSURIA: 0
PARESTHESIAS: 0
HEADACHES: 0
CONSTIPATION: 0
ABDOMINAL PAIN: 0
COUGH: 0
JOINT SWELLING: 0
SORE THROAT: 0
NERVOUS/ANXIOUS: 0
HEARTBURN: 0
NAUSEA: 0

## 2023-12-05 ENCOUNTER — OFFICE VISIT (OUTPATIENT)
Dept: FAMILY MEDICINE | Facility: CLINIC | Age: 38
End: 2023-12-05
Payer: COMMERCIAL

## 2023-12-05 ENCOUNTER — TELEPHONE (OUTPATIENT)
Dept: FAMILY MEDICINE | Facility: CLINIC | Age: 38
End: 2023-12-05

## 2023-12-05 VITALS
TEMPERATURE: 98.1 F | WEIGHT: 148 LBS | HEART RATE: 67 BPM | SYSTOLIC BLOOD PRESSURE: 136 MMHG | BODY MASS INDEX: 21.92 KG/M2 | HEIGHT: 69 IN | OXYGEN SATURATION: 99 % | DIASTOLIC BLOOD PRESSURE: 62 MMHG | RESPIRATION RATE: 13 BRPM

## 2023-12-05 DIAGNOSIS — N52.9 ERECTILE DYSFUNCTION, UNSPECIFIED ERECTILE DYSFUNCTION TYPE: ICD-10-CM

## 2023-12-05 DIAGNOSIS — Z00.00 ENCOUNTER FOR ROUTINE HISTORY AND PHYSICAL EXAMINATION OF ADULT: Primary | ICD-10-CM

## 2023-12-05 PROCEDURE — 99395 PREV VISIT EST AGE 18-39: CPT | Performed by: FAMILY MEDICINE

## 2023-12-05 RX ORDER — SILDENAFIL 100 MG/1
TABLET, FILM COATED ORAL
Qty: 18 TABLET | Refills: 2 | Status: SHIPPED | OUTPATIENT
Start: 2023-12-05

## 2023-12-05 RX ORDER — TADALAFIL 10 MG/1
10-20 TABLET ORAL DAILY PRN
Qty: 30 TABLET | Refills: 3 | Status: SHIPPED | OUTPATIENT
Start: 2023-12-05

## 2023-12-05 NOTE — PROGRESS NOTES
"SUBJECTIVE:   Norman is a 38 year old, presenting for the following:  Physical        12/5/2023     2:50 PM   Additional Questions   Roomed by Claudette HAGEN    Today's PHQ-2 Score:       12/5/2023     2:42 PM   PHQ-2 ( 1999 Pfizer)   Q1: Little interest or pleasure in doing things 0   Q2: Feeling down, depressed or hopeless 0   PHQ-2 Score 0   Q1: Little interest or pleasure in doing things Not at all   Q2: Feeling down, depressed or hopeless Not at all   PHQ-2 Score 0         Things are going well.         Social History     Tobacco Use    Smoking status: Never    Smokeless tobacco: Never   Substance Use Topics    Alcohol use: Yes     Alcohol/week: 14.0 standard drinks of alcohol             11/28/2023    10:06 AM   Alcohol Use   Prescreen: >3 drinks/day or >7 drinks/week? No       Last PSA: No results found for: \"PSA\"    Reviewed orders with patient. Reviewed health maintenance and updated orders accordingly - Yes      Reviewed and updated as needed this visit by clinical staff   Tobacco  Allergies  Meds  Problems  Med Hx  Surg Hx  Fam Hx          Reviewed and updated as needed this visit by Provider   Tobacco  Allergies  Meds  Problems  Med Hx  Surg Hx  Fam Hx             Review of Systems  CONSTITUTIONAL: NEGATIVE for fever, chills, change in weight  INTEGUMENTARY/SKIN: NEGATIVE for worrisome rashes, moles or lesions  EYES: NEGATIVE for vision changes or irritation  ENT: NEGATIVE for ear, mouth and throat problems  RESP: NEGATIVE for significant cough or SOB  CV: NEGATIVE for chest pain, palpitations or peripheral edema  GI: NEGATIVE for nausea, abdominal pain, heartburn, or change in bowel habits   male: negative for dysuria, hematuria, decreased urinary stream, erectile dysfunction, urethral discharge  MUSCULOSKELETAL: NEGATIVE for significant arthralgias or myalgia  NEURO: NEGATIVE for weakness, dizziness or paresthesias  PSYCHIATRIC: NEGATIVE for changes in mood or affect    OBJECTIVE:   BP " "136/62   Pulse 67   Temp 98.1  F (36.7  C)   Resp 13   Ht 1.753 m (5' 9\")   Wt 67.1 kg (148 lb)   SpO2 99%   BMI 21.86 kg/m      Physical Exam  GENERAL: healthy, alert and no distress  EYES: Eyes grossly normal to inspection, PERRL and conjunctivae and sclerae normal  HENT: ear canals and TM's normal, nose and mouth without ulcers or lesions  NECK: no adenopathy, no asymmetry, masses, or scars and thyroid normal to palpation  RESP: lungs clear to auscultation - no rales, rhonchi or wheezes  CV: regular rate and rhythm, normal S1 S2, no S3 or S4, no murmur, click or rub, no peripheral edema and peripheral pulses strong  ABDOMEN: soft, nontender, no hepatosplenomegaly, no masses and bowel sounds normal  MS: no gross musculoskeletal defects noted, no edema  SKIN: no suspicious lesions or rashes  NEURO: Normal strength and tone, mentation intact and speech normal  PSYCH: mentation appears normal, affect normal/bright        ASSESSMENT/PLAN:   Norman was seen today for physical.    Diagnoses and all orders for this visit:    Encounter for routine history and physical examination of adult   Routine health maintenance discussion:  No smoking, limited alcohol (7 or less servings per week), 5 fruits/veg servings per day, 200 minutes of exercise per week.  Daily calcium/vitamin D guidelines, bone health, colon cancer screening beginning at age 50.  Accident avoidance, sun screen.    Erectile dysfunction, unspecified erectile dysfunction type  -     tadalafil (CIALIS) 10 MG tablet; Take 1-2 tablets (10-20 mg) by mouth daily as needed (intercourse)  -     sildenafil (VIAGRA) 100 MG tablet; Take 1/2 tablet to 1 tablet by mouth about 30 minutes prior to sex. Max daily dose of 1 tablet.  - Doing well, renew for as needed use.     Other orders  -     REVIEW OF HEALTH MAINTENANCE PROTOCOL ORDERS        Patient has been advised of split billing requirements and indicates understanding: Yes      COUNSELING:   Reviewed preventive " health counseling, as reflected in patient instructions       Regular exercise       Healthy diet/nutrition       Alcohol Use        Colorectal cancer screening        He reports that he has never smoked. He has never used smokeless tobacco.            Payal Garcia MD  [facility]

## 2023-12-05 NOTE — TELEPHONE ENCOUNTER
Called and spoke with pharmacist, verified that patient gets both medications.    Claudette Goldstein, CMA

## 2023-12-05 NOTE — TELEPHONE ENCOUNTER
General Call      Reason for Call:  medication clarification    What are your questions or concerns:  Saray calling from [facility] pharmacy-wanted to clarify that both the tadalafil and sildenafil are for pt or if it is supposed to one or the other?  Please call them at [phone]

## 2024-01-22 DIAGNOSIS — N52.9 ERECTILE DYSFUNCTION, UNSPECIFIED ERECTILE DYSFUNCTION TYPE: ICD-10-CM

## 2024-01-22 NOTE — TELEPHONE ENCOUNTER
Refill Request  Medication name: Pending Prescriptions:                       Disp   Refills    tadalafil (CIALIS) 10 MG tablet           30 tab*3            Sig: Take 1-2 tablets (10-20 mg) by mouth daily as           needed (intercourse)    Requested Pharmacy:  [facility] - [place], [place] - [address]

## 2024-01-23 RX ORDER — TADALAFIL 10 MG/1
10-20 TABLET ORAL DAILY PRN
Qty: 30 TABLET | Refills: 3 | OUTPATIENT
Start: 2024-01-23

## 2025-01-18 ENCOUNTER — HEALTH MAINTENANCE LETTER (OUTPATIENT)
Age: 40
End: 2025-01-18

## 2025-06-09 ENCOUNTER — MYC MEDICAL ADVICE (OUTPATIENT)
Dept: FAMILY MEDICINE | Facility: CLINIC | Age: 40
End: 2025-06-09
Payer: COMMERCIAL

## 2025-06-10 NOTE — TELEPHONE ENCOUNTER
Spoke with KUBOO Mica, made aware that patient is prescribed and using both the sildenafil 10 mg and the tadalafil 10 mg as at times, the sildenafil takes longer to work. No further questions at this time. Pharmacy will work on processing the scripts for the patient.       Erika Rebollar MA on 6/10/2025 at 10:02 AM

## 2025-06-13 SDOH — HEALTH STABILITY: PHYSICAL HEALTH: ON AVERAGE, HOW MANY MINUTES DO YOU ENGAGE IN EXERCISE AT THIS LEVEL?: 150+ MIN

## 2025-06-13 SDOH — HEALTH STABILITY: PHYSICAL HEALTH: ON AVERAGE, HOW MANY DAYS PER WEEK DO YOU ENGAGE IN MODERATE TO STRENUOUS EXERCISE (LIKE A BRISK WALK)?: 7 DAYS

## 2025-06-13 ASSESSMENT — SOCIAL DETERMINANTS OF HEALTH (SDOH): HOW OFTEN DO YOU GET TOGETHER WITH FRIENDS OR RELATIVES?: ONCE A WEEK

## 2025-06-18 ENCOUNTER — OFFICE VISIT (OUTPATIENT)
Dept: FAMILY MEDICINE | Facility: CLINIC | Age: 40
End: 2025-06-18
Payer: COMMERCIAL

## 2025-06-18 VITALS
BODY MASS INDEX: 24.25 KG/M2 | OXYGEN SATURATION: 96 % | DIASTOLIC BLOOD PRESSURE: 76 MMHG | RESPIRATION RATE: 12 BRPM | SYSTOLIC BLOOD PRESSURE: 128 MMHG | HEIGHT: 68 IN | TEMPERATURE: 98 F | HEART RATE: 78 BPM | WEIGHT: 160 LBS

## 2025-06-18 DIAGNOSIS — Z23 IMMUNIZATION DUE: ICD-10-CM

## 2025-06-18 DIAGNOSIS — N52.9 ERECTILE DYSFUNCTION, UNSPECIFIED ERECTILE DYSFUNCTION TYPE: ICD-10-CM

## 2025-06-18 DIAGNOSIS — Z00.00 ROUTINE GENERAL MEDICAL EXAMINATION AT A HEALTH CARE FACILITY: Primary | ICD-10-CM

## 2025-06-18 PROCEDURE — 90471 IMMUNIZATION ADMIN: CPT | Performed by: FAMILY MEDICINE

## 2025-06-18 PROCEDURE — 90746 HEPB VACCINE 3 DOSE ADULT IM: CPT | Performed by: FAMILY MEDICINE

## 2025-06-18 PROCEDURE — 99213 OFFICE O/P EST LOW 20 MIN: CPT | Mod: 25 | Performed by: FAMILY MEDICINE

## 2025-06-18 PROCEDURE — 99396 PREV VISIT EST AGE 40-64: CPT | Mod: 25 | Performed by: FAMILY MEDICINE

## 2025-06-18 PROCEDURE — 1126F AMNT PAIN NOTED NONE PRSNT: CPT | Performed by: FAMILY MEDICINE

## 2025-06-18 PROCEDURE — 3078F DIAST BP <80 MM HG: CPT | Performed by: FAMILY MEDICINE

## 2025-06-18 PROCEDURE — 3074F SYST BP LT 130 MM HG: CPT | Performed by: FAMILY MEDICINE

## 2025-06-18 ASSESSMENT — PAIN SCALES - GENERAL: PAINLEVEL_OUTOF10: NO PAIN (0)

## 2025-06-18 NOTE — PROGRESS NOTES
Preventive Care Visit  [facility]  Payal Garcia MD, Family Medicine  Jun 18, 2025      Assessment & Plan     Routine general medical examination at a health care facility  -Routine health maintenance discussion:  No smoking, limited alcohol (7 or less servings per week), 5 fruits/veg servings per day, 200 minutes of exercise per week.  Daily calcium/vitamin D guidelines, bone health, colon cancer screening beginning at age 50.  Accident avoidance, sun screen.     Erectile Dysfunction  -Will trial 20 mg of Cialis, if not working, he'll let me know and we can consider alternate medications. He'll also be more cognizant about what he has eaten and drank prior to the Sildenafil.     Immunization due  - HEPATITIS B, ADULT 20+ (ENGERIX-B/RECOMBIVAX HB)        Reviewed preventive health counseling, as reflected in patient instructions       Regular exercise       Advance Care Planning  Counseling  Appropriate preventive services were addressed with this patient via screening, questionnaire, or discussion as appropriate for fall prevention, nutrition, physical activity, Tobacco-use cessation, social engagement, weight loss and cognition.  Checklist reviewing preventive services available has been given to the patient.  Reviewed patient's diet, addressing concerns and/or questions.   He is at risk for psychosocial distress and has been provided with information to reduce risk.       Milton Austin is a 39 year old, presenting for the following:  Physical (Patient is here today for an annual exam, non-fasting if lab work is needed. )        6/18/2025     1:42 PM   Additional Questions   Roomed by Erika JOSE CMA   Accompanied by N/A         6/18/2025     1:42 PM   Patient Reported Additional Medications   Patient reports taking the following new medications N/A          HPI    He is primarily using sildenafil, uses the tadalafil less as it doesn't work as well. The Cialis works less well  than the Viagra. He does note that the Viagra isn't working as well as it was historically.       Advance Care Planning    Discussed advance care planning with patient; informed AVS has link to [facility].        6/13/2025   General Health   How would you rate your overall physical health? Good   Feel stress (tense, anxious, or unable to sleep) To some extent   (!) STRESS CONCERN      6/13/2025   Nutrition   Three or more servings of calcium each day? (!) I DON'T KNOW   Diet: Regular (no restrictions)   How many servings of fruit and vegetables per day? (!) 2-3   How many sweetened beverages each day? (!) 2         6/13/2025   Exercise   Days per week of moderate/strenous exercise 7 days   Average minutes spent exercising at this level 150+ min         6/13/2025   Social Factors   Frequency of gathering with friends or relatives Once a week   Worry food won't last until get money to buy more No   Food not last or not have enough money for food? No   Do you have housing? (Housing is defined as stable permanent housing and does not include staying outside in a car, in a tent, in an abandoned building, in an overnight shelter, or couch-surfing.) Yes   Are you worried about losing your housing? No   Lack of transportation? No   Unable to get utilities (heat,electricity)? No         6/13/2025   Dental   Dentist two times every year? (!) DECLINE         Today's PHQ-2 Score:       6/17/2025     4:46 PM   PHQ-2 ( 1999 [employer])   Q1: Little interest or pleasure in doing things 0   Q2: Feeling down, depressed or hopeless 0   PHQ-2 Score 0    Q1: Little interest or pleasure in doing things Not at all   Q2: Feeling down, depressed or hopeless Not at all   PHQ-2 Score 0       Patient-reported           6/13/2025   Substance Use   Alcohol more than 3/day or more than 7/wk No   Do you use any other substances recreationally? No     Social History     Tobacco Use    Smoking status: Never     Passive exposure: Never     "Smokeless tobacco: Never   Vaping Use    Vaping status: Never Used   Substance Use Topics    Alcohol use: Yes     Alcohol/week: 14.0 standard drinks of alcohol           6/13/2025   STI Screening   New sexual partner(s) since last STI/HIV test? No   ASCVD Risk   The 10-year ASCVD risk score (Abraham KLINE, et al., 2019) is: 0.8%    Values used to calculate the score:      Age: 39 years      Sex: Male      Is Non- : No      Diabetic: No      Tobacco smoker: No      Systolic Blood Pressure: 128 mmHg      Is BP treated: No      HDL Cholesterol: 60 mg/dL      Total Cholesterol: 191 mg/dL        6/13/2025   Contraception/Family Planning   Questions about contraception or family planning No        Reviewed and updated as needed this visit by Provider   Tobacco  Allergies  Meds  Problems  Med Hx  Surg Hx  Fam Hx            Review of Systems  Constitutional, HEENT, cardiovascular, pulmonary, gi and gu systems are negative, except as otherwise noted.     Objective    Exam  /76 (BP Location: Right arm, Patient Position: Sitting, Cuff Size: Adult Regular)   Pulse 78   Temp 98  F (36.7  C) (Oral)   Resp 12   Ht 1.727 m (5' 8\")   Wt 72.6 kg (160 lb)   SpO2 96%   BMI 24.33 kg/m     Estimated body mass index is 24.33 kg/m  as calculated from the following:    Height as of this encounter: 1.727 m (5' 8\").    Weight as of this encounter: 72.6 kg (160 lb).    Physical Exam  GENERAL: alert and no distress  EYES: Eyes grossly normal to inspection, PERRL and conjunctivae and sclerae normal  HENT: ear canals and TM's normal, nose and mouth without ulcers or lesions  NECK: no adenopathy, no asymmetry, masses, or scars  RESP: lungs clear to auscultation - no rales, rhonchi or wheezes  CV: regular rate and rhythm, normal S1 S2, no S3 or S4, no murmur, click or rub, no peripheral edema  ABDOMEN: soft, nontender, no hepatosplenomegaly, no masses and bowel sounds normal  MS: no gross " musculoskeletal defects noted, no edema  SKIN: no suspicious lesions or rashes  NEURO: Normal strength and tone, mentation intact and speech normal  PSYCH: mentation appears normal, affect normal/bright        Signed Electronically by: Payal Garcia MD

## 2025-06-18 NOTE — PATIENT INSTRUCTIONS
Let's have you try doing two of the Tadalafil pills at a time. If this works better let me know and I can send in a new prescription.   Watch the sildenafil intake with fatty food and alcohol, this can make it work less well.     Patient Education   Preventive Care Advice   This is general advice given by our system to help you stay healthy. However, your care team may have specific advice just for you. Please talk to your care team about your preventive care needs.  Nutrition  Eat 5 or more servings of fruits and vegetables each day.  Try wheat bread, brown rice and whole grain pasta (instead of white bread, rice, and pasta).  Get enough calcium and vitamin D. Check the label on foods and aim for 100% of the RDA (recommended daily allowance).  Lifestyle  Exercise at least 150 minutes each week  (30 minutes a day, 5 days a week).  Do muscle strengthening activities 2 days a week. These help control your weight and prevent disease.  No smoking.  Wear sunscreen to prevent skin cancer.  Have a dental exam and cleaning every 6 months.  Yearly exams  See your health care team every year to talk about:  Any changes in your health.  Any medicines your care team has prescribed.  Preventive care, family planning, and ways to prevent chronic diseases.  Shots (vaccines)   HPV shots (up to age 26), if you've never had them before.  Hepatitis B shots (up to age 59), if you've never had them before.  COVID-19 shot: Get this shot when it's due.  Flu shot: Get a flu shot every year.  Tetanus shot: Get a tetanus shot every 10 years.  Pneumococcal, hepatitis A, and RSV shots: Ask your care team if you need these based on your risk.  Shingles shot (for age 50 and up)  General health tests  Diabetes screening:  Starting at age 35, Get screened for diabetes at least every 3 years.  If you are younger than age 35, ask your care team if you should be screened for diabetes.  Cholesterol test: At age 39, start having a cholesterol test  every 5 years, or more often if advised.  Bone density scan (DEXA): At age 50, ask your care team if you should have this scan for osteoporosis (brittle bones).  Hepatitis C: Get tested at least once in your life.  STIs (sexually transmitted infections)  Before age 24: Ask your care team if you should be screened for STIs.  After age 24: Get screened for STIs if you're at risk. You are at risk for STIs (including HIV) if:  You are sexually active with more than one person.  You don't use condoms every time.  You or a partner was diagnosed with a sexually transmitted infection.  If you are at risk for HIV, ask about PrEP medicine to prevent HIV.  Get tested for HIV at least once in your life, whether you are at risk for HIV or not.  Cancer screening tests  Cervical cancer screening: If you have a cervix, begin getting regular cervical cancer screening tests starting at age 21.  Breast cancer scan (mammogram): If you've ever had breasts, begin having regular mammograms starting at age 40. This is a scan to check for breast cancer.  Colon cancer screening: It is important to start screening for colon cancer at age 45.  Have a colonoscopy test every 10 years (or more often if you're at risk) Or, ask your provider about stool tests like a FIT test every year or Cologuard test every 3 years.  To learn more about your testing options, visit:   .  For help making a decision, visit:   https://bit.ly/zw03575.  Prostate cancer screening test: If you have a prostate, ask your care team if a prostate cancer screening test (PSA) at age 55 is right for you.  Lung cancer screening: If you are a current or former smoker ages 50 to 80, ask your care team if ongoing lung cancer screenings are right for you.  For informational purposes only. Not to replace the advice of your health care provider. Copyright   2023 [employer]. All rights reserved. Clinically reviewed by the [facility]. [employer]  500980 - REV 01/24.  Learning About Stress  What is stress?     Stress is your body's response to a hard situation. Your body can have a physical, emotional, or mental response. Stress is a fact of life for most people, and it affects everyone differently. What causes stress for you may not be stressful for someone else.  A lot of things can cause stress. You may feel stress when you go on a job interview, take a test, or run a race. This kind of short-term stress is normal and even useful. It can help you if you need to work hard or react quickly. For example, stress can help you finish an important job on time.  Long-term stress is caused by ongoing stressful situations or events. Examples of long-term stress include long-term health problems, ongoing problems at work, or conflicts in your family. Long-term stress can harm your health.  How does stress affect your health?  When you are stressed, your body responds as though you are in danger. It makes hormones that speed up your heart, make you breathe faster, and give you a burst of energy. This is called the fight-or-flight stress response. If the stress is over quickly, your body goes back to normal and no harm is done.  But if stress happens too often or lasts too long, it can have bad effects. Long-term stress can make you more likely to get sick, and it can make symptoms of some diseases worse. If you tense up when you are stressed, you may develop neck, shoulder, or low back pain. Stress is linked to high blood pressure and heart disease.  Stress also harms your emotional health. It can make you alvarez, tense, or depressed. Your relationships may suffer, and you may not do well at work or school.  What can you do to manage stress?  You can try these things to help manage stress:   Do something active. Exercise or activity can help reduce stress. Walking is a great way to get started. Even everyday activities such as housecleaning or yard work can help.  Try  yoga or padmini chi. These techniques combine exercise and meditation. You may need some training at first to learn them.  Do something you enjoy. For example, listen to music or go to a movie. Practice your hobby or do volunteer work.  Meditate. This can help you relax, because you are not worrying about what happened before or what may happen in the future.  Do guided imagery. Imagine yourself in any setting that helps you feel calm. You can use online videos, books, or a teacher to guide you.  Do breathing exercises. For example:  From a standing position, bend forward from the waist with your knees slightly bent. Let your arms dangle close to the floor.  Breathe in slowly and deeply as you return to a standing position. Roll up slowly and lift your head last.  Hold your breath for just a few seconds in the standing position.  Breathe out slowly and bend forward from the waist.  Let your feelings out. Talk, laugh, cry, and express anger when you need to. Talking with supportive friends or family, a counselor, or a tino leader about your feelings is a healthy way to relieve stress. Avoid discussing your feelings with people who make you feel worse.  Write. It may help to write about things that are bothering you. This helps you find out how much stress you feel and what is causing it. When you know this, you can find better ways to cope.  What can you do to prevent stress?  You might try some of these things to help prevent stress:  Manage your time. This helps you find time to do the things you want and need to do.  Get enough sleep. Your body recovers from the stresses of the day while you are sleeping.  Get support. Your family, friends, and community can make a difference in how you experience stress.  Limit your news feed. Avoid or limit time on social media or news that may make you feel stressed.  Do something active. Exercise or activity can help reduce stress. Walking is a great way to get started.  Where  "can you learn more?  Go to https://www.[employer].net/patiented  Enter N032 in the search box to learn more about \"Learning About Stress.\"  Current as of: October 24, 2024  Content Version: 14.5 2024-2025 [employer].   Care instructions adapted under license by your healthcare professional. If you have questions about a medical condition or this instruction, always ask your healthcare professional. [employer] disclaims any warranty or liability for your use of this information.       "